# Patient Record
Sex: MALE | Race: WHITE | URBAN - METROPOLITAN AREA
[De-identification: names, ages, dates, MRNs, and addresses within clinical notes are randomized per-mention and may not be internally consistent; named-entity substitution may affect disease eponyms.]

---

## 2020-11-02 ENCOUNTER — LAB ONLY (OUTPATIENT)
Dept: INTERNAL MEDICINE CLINIC | Age: 30
End: 2020-11-02

## 2020-11-02 DIAGNOSIS — Z23 ENCOUNTER FOR IMMUNIZATION: Primary | ICD-10-CM

## 2020-11-02 PROCEDURE — 86580 TB INTRADERMAL TEST: CPT

## 2020-11-04 LAB
MM INDURATION POC: 0 MM (ref 0–5)
PPD POC: NEGATIVE NEGATIVE

## 2020-11-04 NOTE — PROGRESS NOTES
PPD Placement note  Pamela Swanson, 31 y.o. male is here today for placement of PPD test  Reason for PPD test: school  Pt taken PPD test before: yes  Verified in allergy area and with patient that they are not allergic to the products PPD is made of (Phenol or Tween). No: no  Is patient taking any oral or IV steroid medication now or have they taken it in the last month? no  Has the patient ever received the BCG vaccine?: no  Has the patient been in recent contact with anyone known or suspected of having active TB disease?: no       Date of exposure (if applicable): n/a       Name of person they were exposed to (if applicable): n/a  Patient's Country of origin?:   O: Alert and oriented in NAD. P:  PPD placed on 11/2/2020. Patient advised to return for reading within 48-72 hours.
PPD Reading Note  PPD read and results entered in [email or web address]. Result: 0 mm induration.   Interpretation: negative  If test not read within 48-72 hours of initial placement, patient advised to repeat in other arm 1-3 weeks after this test.  Allergic reaction: no
Who Is Your Referring Provider?: Antonia MADRID